# Patient Record
Sex: FEMALE | Race: WHITE | NOT HISPANIC OR LATINO | URBAN - METROPOLITAN AREA
[De-identification: names, ages, dates, MRNs, and addresses within clinical notes are randomized per-mention and may not be internally consistent; named-entity substitution may affect disease eponyms.]

---

## 2020-07-25 ENCOUNTER — TELEPHONE ENCOUNTER (OUTPATIENT)
Dept: URBAN - METROPOLITAN AREA CLINIC 13 | Facility: CLINIC | Age: 45
End: 2020-07-25

## 2020-07-26 ENCOUNTER — TELEPHONE ENCOUNTER (OUTPATIENT)
Dept: URBAN - METROPOLITAN AREA CLINIC 13 | Facility: CLINIC | Age: 45
End: 2020-07-26

## 2020-07-26 RX ORDER — NORETHINDRONE ACETATE AND ETHINYL ESTRADIOL, ETHINYL ESTRADIOL AND FERROUS FUMARATE 1MG-10(24)
TAKE 1 TABLET DAILY KIT ORAL
Refills: 0 | Status: ACTIVE | COMMUNITY

## 2021-09-08 ENCOUNTER — WEB ENCOUNTER (OUTPATIENT)
Dept: URBAN - METROPOLITAN AREA CLINIC 113 | Facility: CLINIC | Age: 46
End: 2021-09-08

## 2021-09-08 ENCOUNTER — LAB OUTSIDE AN ENCOUNTER (OUTPATIENT)
Dept: URBAN - METROPOLITAN AREA CLINIC 113 | Facility: CLINIC | Age: 46
End: 2021-09-08

## 2021-09-08 ENCOUNTER — OFFICE VISIT (OUTPATIENT)
Dept: URBAN - METROPOLITAN AREA CLINIC 113 | Facility: CLINIC | Age: 46
End: 2021-09-08
Payer: COMMERCIAL

## 2021-09-08 VITALS
HEART RATE: 71 BPM | DIASTOLIC BLOOD PRESSURE: 66 MMHG | HEIGHT: 64 IN | SYSTOLIC BLOOD PRESSURE: 109 MMHG | BODY MASS INDEX: 20.14 KG/M2 | TEMPERATURE: 98.4 F | WEIGHT: 118 LBS

## 2021-09-08 DIAGNOSIS — K59.1 FUNCTIONAL DIARRHEA: ICD-10-CM

## 2021-09-08 DIAGNOSIS — R10.32 LEFT LOWER QUADRANT PAIN: ICD-10-CM

## 2021-09-08 DIAGNOSIS — R10.31 RIGHT LOWER QUADRANT PAIN: ICD-10-CM

## 2021-09-08 PROCEDURE — 99204 OFFICE O/P NEW MOD 45 MIN: CPT | Performed by: INTERNAL MEDICINE

## 2021-09-08 RX ORDER — ASCORBIC ACID/ASCORBATE SODIUM 500 MG
1 TABLET TABLET,CHEWABLE ORAL ONCE A DAY
Status: ACTIVE | COMMUNITY

## 2021-09-08 RX ORDER — SPIRONOLACTONE 100 MG/1
1 TABLET TABLET, FILM COATED ORAL ONCE A DAY
Status: ACTIVE | COMMUNITY

## 2021-09-08 RX ORDER — PROGESTERONE 200 MG/1
1 CAPSULE ONE TIME  DAILY CAPSULE ORAL
Status: ACTIVE | COMMUNITY

## 2021-09-08 RX ORDER — B-COMPLEX WITH VITAMIN C
1 TABLET TABLET ORAL ONCE A DAY
Status: ACTIVE | COMMUNITY

## 2021-09-08 RX ORDER — TESTOSTERONE 100 MG
AS DIRECTED PELLET (EA) IMPLANTATION
Status: ACTIVE | COMMUNITY

## 2021-09-08 RX ORDER — NORETHINDRONE ACETATE AND ETHINYL ESTRADIOL, ETHINYL ESTRADIOL AND FERROUS FUMARATE 1MG-10(24)
TAKE 1 TABLET DAILY KIT ORAL
Refills: 0 | Status: ON HOLD | COMMUNITY

## 2021-09-08 RX ORDER — BIMATOPROST 0.1 MG/ML
1 DROP INTO AFFECTED EYE IN THE EVENING SOLUTION/ DROPS OPHTHALMIC ONCE A DAY
Status: ACTIVE | COMMUNITY

## 2021-09-08 NOTE — HPI-TODAY'S VISIT:
45 yo female with a history of diarrhea and blood per rectum, likely due to infectious colitis with post infectious IBS in 2016, presenting for evaluation of abdominal pain.  She has had a several month history of diarrhea occurring intermittently, lately increasing in frequency. This is reported as similar to her symptoms in 2016. No blood per rectum. She has occasional abdominal cramping and bloating, which improves with bowel movements. No fever or chills. She has woken up at night for a bowel movement. At most, 2 or 3 times per night, and as frequently as once per week. Her weight is stable. Appetite is fair. There have been no sick contacts. She denies NSAIDs use. No changes to her medications or new supplements over the last few months. She has not tried Imodium or daily fiber supplementation.  No family history of liver disease, pancreas disease, stomach cancer. Her paternal grandfather had colon cancer. No family history of IBD. Her mom has IBS.

## 2021-09-09 ENCOUNTER — TELEPHONE ENCOUNTER (OUTPATIENT)
Dept: URBAN - METROPOLITAN AREA CLINIC 113 | Facility: CLINIC | Age: 46
End: 2021-09-09

## 2021-09-09 RX ORDER — POLYETHYLENE GLYCOL 3350, SODIUM CHLORIDE, SODIUM BICARBONATE AND POTASSIUM CHLORIDE 420G
AS DIRECTED KIT ORAL ONCE
Qty: 420 GRAM | Refills: 0 | OUTPATIENT
Start: 2021-09-09 | End: 2021-09-10

## 2021-09-10 LAB
A/G RATIO: 2
ALBUMIN: 4.7
ALKALINE PHOSPHATASE: 66
ALT (SGPT): 22
AST (SGOT): 26
BASO (ABSOLUTE): 0
BASOS: 1
BILIRUBIN, TOTAL: 0.4
BUN/CREATININE RATIO: 17
BUN: 10
CALCIUM: 9.5
CARBON DIOXIDE, TOTAL: 26
CHLORIDE: 102
CREATININE: 0.58
DEAMIDATED GLIADIN ABS, IGA: 4
DEAMIDATED GLIADIN ABS, IGG: 2
EGFR IF AFRICN AM: 130
EGFR IF NONAFRICN AM: 112
ENDOMYSIAL ANTIBODY IGA: NEGATIVE
EOS (ABSOLUTE): 0.2
EOS: 4
GLOBULIN, TOTAL: 2.3
GLUCOSE: 97
HEMATOCRIT: 38
HEMATOLOGY COMMENTS:: (no result)
HEMOGLOBIN: 12.8
IMMATURE CELLS: (no result)
IMMATURE GRANS (ABS): 0
IMMATURE GRANULOCYTES: 0
IMMUNOGLOBULIN A, QN, SERUM: 126
LYMPHS (ABSOLUTE): 1.5
LYMPHS: 26
MCH: 34
MCHC: 33.7
MCV: 101
MONOCYTES(ABSOLUTE): 0.4
MONOCYTES: 7
NEUTROPHILS (ABSOLUTE): 3.6
NEUTROPHILS: 62
NRBC: (no result)
PLATELETS: 310
POTASSIUM: 4.1
PROTEIN, TOTAL: 7
RBC: 3.77
RDW: 12
SODIUM: 142
T-TRANSGLUTAMINASE (TTG) IGA: <2
T-TRANSGLUTAMINASE (TTG) IGG: <2
WBC: 5.8

## 2021-09-15 ENCOUNTER — OFFICE VISIT (OUTPATIENT)
Dept: URBAN - METROPOLITAN AREA SURGERY CENTER 25 | Facility: SURGERY CENTER | Age: 46
End: 2021-09-15
Payer: COMMERCIAL

## 2021-09-15 ENCOUNTER — CLAIMS CREATED FROM THE CLAIM WINDOW (OUTPATIENT)
Dept: URBAN - METROPOLITAN AREA CLINIC 4 | Facility: CLINIC | Age: 46
End: 2021-09-15
Payer: COMMERCIAL

## 2021-09-15 DIAGNOSIS — K52.831 COLLAGENOUS COLITIS: ICD-10-CM

## 2021-09-15 LAB
C DIFFICILE TOXINS A+B, EIA: NEGATIVE
CAMPYLOBACTER CULTURE: (no result)
E COLI SHIGA TOXIN EIA: NEGATIVE
LACTOFERRIN, FECAL, QUANT.: <1
Lab: (no result)
Lab: (no result)
OVA + PARASITE EXAM: (no result)
SALMONELLA/SHIGELLA SCREEN: (no result)

## 2021-09-15 PROCEDURE — G8907 PT DOC NO EVENTS ON DISCHARG: HCPCS | Performed by: INTERNAL MEDICINE

## 2021-09-15 PROCEDURE — 88305 TISSUE EXAM BY PATHOLOGIST: CPT | Performed by: PATHOLOGY

## 2021-09-15 PROCEDURE — 88342 IMHCHEM/IMCYTCHM 1ST ANTB: CPT | Performed by: PATHOLOGY

## 2021-09-15 PROCEDURE — 45380 COLONOSCOPY AND BIOPSY: CPT | Performed by: INTERNAL MEDICINE

## 2021-09-15 PROCEDURE — 88313 SPECIAL STAINS GROUP 2: CPT | Performed by: PATHOLOGY

## 2021-09-15 RX ORDER — NORETHINDRONE ACETATE AND ETHINYL ESTRADIOL, ETHINYL ESTRADIOL AND FERROUS FUMARATE 1MG-10(24)
TAKE 1 TABLET DAILY KIT ORAL
Refills: 0 | Status: ON HOLD | COMMUNITY

## 2021-09-15 RX ORDER — PROGESTERONE 200 MG/1
1 CAPSULE ONE TIME  DAILY CAPSULE ORAL
Status: ACTIVE | COMMUNITY

## 2021-09-15 RX ORDER — SPIRONOLACTONE 100 MG/1
1 TABLET TABLET, FILM COATED ORAL ONCE A DAY
Status: ACTIVE | COMMUNITY

## 2021-09-15 RX ORDER — TESTOSTERONE 100 MG
AS DIRECTED PELLET (EA) IMPLANTATION
Status: ACTIVE | COMMUNITY

## 2021-09-15 RX ORDER — ASCORBIC ACID/ASCORBATE SODIUM 500 MG
1 TABLET TABLET,CHEWABLE ORAL ONCE A DAY
Status: ACTIVE | COMMUNITY

## 2021-09-15 RX ORDER — B-COMPLEX WITH VITAMIN C
1 TABLET TABLET ORAL ONCE A DAY
Status: ACTIVE | COMMUNITY

## 2021-09-15 RX ORDER — BIMATOPROST 0.1 MG/ML
1 DROP INTO AFFECTED EYE IN THE EVENING SOLUTION/ DROPS OPHTHALMIC ONCE A DAY
Status: ACTIVE | COMMUNITY

## 2021-09-24 ENCOUNTER — TELEPHONE ENCOUNTER (OUTPATIENT)
Dept: URBAN - METROPOLITAN AREA CLINIC 113 | Facility: CLINIC | Age: 46
End: 2021-09-24

## 2021-09-24 RX ORDER — SPIRONOLACTONE 100 MG/1
1 TABLET TABLET, FILM COATED ORAL ONCE A DAY
Status: ACTIVE | COMMUNITY

## 2021-09-24 RX ORDER — ASCORBIC ACID/ASCORBATE SODIUM 500 MG
1 TABLET TABLET,CHEWABLE ORAL ONCE A DAY
Status: ACTIVE | COMMUNITY

## 2021-09-24 RX ORDER — TESTOSTERONE 100 MG
AS DIRECTED PELLET (EA) IMPLANTATION
Status: ACTIVE | COMMUNITY

## 2021-09-24 RX ORDER — BUDESONIDE 3 MG/1
3 CAPSULES CAPSULE, COATED PELLETS ORAL ONCE A DAY
Qty: 90 CAPSULE | Refills: 1 | OUTPATIENT
Start: 2021-09-24

## 2021-09-24 RX ORDER — PROGESTERONE 200 MG/1
1 CAPSULE ONE TIME  DAILY CAPSULE ORAL
Status: ACTIVE | COMMUNITY

## 2021-09-24 RX ORDER — BIMATOPROST 0.1 MG/ML
1 DROP INTO AFFECTED EYE IN THE EVENING SOLUTION/ DROPS OPHTHALMIC ONCE A DAY
Status: ACTIVE | COMMUNITY

## 2021-09-24 RX ORDER — NORETHINDRONE ACETATE AND ETHINYL ESTRADIOL, ETHINYL ESTRADIOL AND FERROUS FUMARATE 1MG-10(24)
TAKE 1 TABLET DAILY KIT ORAL
Refills: 0 | Status: ON HOLD | COMMUNITY

## 2021-09-24 RX ORDER — B-COMPLEX WITH VITAMIN C
1 TABLET TABLET ORAL ONCE A DAY
Status: ACTIVE | COMMUNITY

## 2021-09-30 ENCOUNTER — TELEPHONE ENCOUNTER (OUTPATIENT)
Dept: URBAN - METROPOLITAN AREA CLINIC 40 | Facility: CLINIC | Age: 46
End: 2021-09-30

## 2021-10-14 ENCOUNTER — OFFICE VISIT (OUTPATIENT)
Dept: URBAN - METROPOLITAN AREA CLINIC 113 | Facility: CLINIC | Age: 46
End: 2021-10-14
Payer: COMMERCIAL

## 2021-10-14 VITALS
HEIGHT: 64 IN | BODY MASS INDEX: 20.14 KG/M2 | TEMPERATURE: 98.4 F | WEIGHT: 118 LBS | SYSTOLIC BLOOD PRESSURE: 95 MMHG | RESPIRATION RATE: 18 BRPM | HEART RATE: 52 BPM | DIASTOLIC BLOOD PRESSURE: 67 MMHG

## 2021-10-14 DIAGNOSIS — K52.831 COLLAGENOUS COLITIS: ICD-10-CM

## 2021-10-14 PROCEDURE — 99213 OFFICE O/P EST LOW 20 MIN: CPT | Performed by: NURSE PRACTITIONER

## 2021-10-14 RX ORDER — TESTOSTERONE 100 MG
AS DIRECTED PELLET (EA) IMPLANTATION
Status: ACTIVE | COMMUNITY

## 2021-10-14 RX ORDER — NORETHINDRONE ACETATE AND ETHINYL ESTRADIOL, ETHINYL ESTRADIOL AND FERROUS FUMARATE 1MG-10(24)
TAKE 1 TABLET DAILY KIT ORAL
Refills: 0 | Status: ON HOLD | COMMUNITY

## 2021-10-14 RX ORDER — BUDESONIDE 3 MG/1
3 CAPSULES CAPSULE, COATED PELLETS ORAL ONCE A DAY
OUTPATIENT
Start: 2021-09-24

## 2021-10-14 RX ORDER — BIMATOPROST 0.1 MG/ML
1 DROP INTO AFFECTED EYE IN THE EVENING SOLUTION/ DROPS OPHTHALMIC ONCE A DAY
Status: ACTIVE | COMMUNITY

## 2021-10-14 RX ORDER — B-COMPLEX WITH VITAMIN C
1 TABLET TABLET ORAL ONCE A DAY
Status: ACTIVE | COMMUNITY

## 2021-10-14 RX ORDER — PROGESTERONE 200 MG/1
1 CAPSULE ONE TIME  DAILY CAPSULE ORAL
Status: ACTIVE | COMMUNITY

## 2021-10-14 RX ORDER — BUDESONIDE 3 MG/1
3 CAPSULES CAPSULE, COATED PELLETS ORAL ONCE A DAY
Qty: 90 CAPSULE | Refills: 1 | Status: ACTIVE | COMMUNITY
Start: 2021-09-24

## 2021-10-14 RX ORDER — ASCORBIC ACID/ASCORBATE SODIUM 500 MG
1 TABLET TABLET,CHEWABLE ORAL ONCE A DAY
Status: ACTIVE | COMMUNITY

## 2021-10-14 RX ORDER — SPIRONOLACTONE 100 MG/1
1 TABLET TABLET, FILM COATED ORAL ONCE A DAY
Status: ACTIVE | COMMUNITY

## 2022-01-12 ENCOUNTER — TELEPHONE ENCOUNTER (OUTPATIENT)
Dept: URBAN - METROPOLITAN AREA CLINIC 113 | Facility: CLINIC | Age: 47
End: 2022-01-12

## 2022-01-12 RX ORDER — BUDESONIDE 3 MG/1
3 CAPSULES DAILY FOR 8 WEEKS, THEN 2 CAPSULES DAILY FOR 2 WEEKS, THEN 1 CAPSULE DAILY FOR 2 WEEKS, THEN STOP CAPSULE, COATED PELLETS ORAL ONCE A DAY
Qty: 210 | Refills: 0
Start: 2021-09-24

## 2022-02-10 ENCOUNTER — OFFICE VISIT (OUTPATIENT)
Dept: URBAN - METROPOLITAN AREA CLINIC 113 | Facility: CLINIC | Age: 47
End: 2022-02-10
Payer: COMMERCIAL

## 2022-02-10 VITALS
SYSTOLIC BLOOD PRESSURE: 92 MMHG | WEIGHT: 117 LBS | DIASTOLIC BLOOD PRESSURE: 60 MMHG | HEIGHT: 64 IN | TEMPERATURE: 98 F | HEART RATE: 52 BPM | BODY MASS INDEX: 19.97 KG/M2

## 2022-02-10 DIAGNOSIS — K52.831 COLLAGENOUS COLITIS: ICD-10-CM

## 2022-02-10 PROCEDURE — 99213 OFFICE O/P EST LOW 20 MIN: CPT | Performed by: INTERNAL MEDICINE

## 2022-02-10 RX ORDER — TESTOSTERONE 100 MG
AS DIRECTED PELLET (EA) IMPLANTATION
Status: ACTIVE | COMMUNITY

## 2022-02-10 RX ORDER — SPIRONOLACTONE 100 MG/1
1 TABLET TABLET, FILM COATED ORAL ONCE A DAY
Status: ACTIVE | COMMUNITY

## 2022-02-10 RX ORDER — ASCORBIC ACID/ASCORBATE SODIUM 500 MG
1 TABLET TABLET,CHEWABLE ORAL ONCE A DAY
Status: ACTIVE | COMMUNITY

## 2022-02-10 RX ORDER — BUDESONIDE 3 MG/1
3 CAPSULES DAILY FOR 8 WEEKS, THEN 2 CAPSULES DAILY FOR 2 WEEKS, THEN 1 CAPSULE DAILY FOR 2 WEEKS, THEN STOP CAPSULE, COATED PELLETS ORAL ONCE A DAY
Qty: 210 | Refills: 0 | Status: ACTIVE | COMMUNITY
Start: 2021-09-24

## 2022-02-10 RX ORDER — PROGESTERONE 200 MG/1
1 CAPSULE ONE TIME  DAILY CAPSULE ORAL
Status: ACTIVE | COMMUNITY

## 2022-02-10 RX ORDER — NORETHINDRONE ACETATE AND ETHINYL ESTRADIOL, ETHINYL ESTRADIOL AND FERROUS FUMARATE 1MG-10(24)
TAKE 1 TABLET DAILY KIT ORAL
Refills: 0 | Status: ON HOLD | COMMUNITY

## 2022-02-10 RX ORDER — B-COMPLEX WITH VITAMIN C
1 TABLET TABLET ORAL ONCE A DAY
Status: ACTIVE | COMMUNITY

## 2022-02-10 RX ORDER — BIMATOPROST 0.1 MG/ML
1 DROP INTO AFFECTED EYE IN THE EVENING SOLUTION/ DROPS OPHTHALMIC ONCE A DAY
Status: ACTIVE | COMMUNITY

## 2022-02-10 RX ORDER — BUDESONIDE 3 MG/1
3 CAPSULES CAPSULE, COATED PELLETS ORAL ONCE A DAY
OUTPATIENT

## 2022-02-10 NOTE — HPI-TODAY'S VISIT:
46-year-old woman with a several month history of lower abdominal cramping with diarrhea predominant change in bowel habits, likely on the basis of functional bowel disorder, presenting for follow-up after labs and colonoscopy. She was seen in the office on 9/8/2021.  She complained of a several month history of lower abdominal cramping with diarrhea.  There was no fever, chills, nocturnal stools or blood per rectum.  Stool studies were negative for Salmonella and Campylobacter, ova and parasite, C. difficile.  Lactoferrin was normal.  Celiac panel was negative as well.  CMP was normal as was CBC.  A colonoscopy was performed 9/15/2021.  Good bowel preparation.  Normal ileum.  Normal rectum.  Normal mucosa throughout the entire examined colon.  Random biopsies were obtained and demonstrated collagenous colitis.  She was started on budesonide, 3 tablets daily for 8 weeks. She finished 8 weeks of budesonide treatment and was doing fairly well however it slowly returned.  She is having about 3-4 bowel movements per day with some mild cramping and no blood.  She started back up on the budesonide for the last 3 weeks.  She is now having one soft bowel movement per day without blood or melena or abdominal pain.  This been no fevers or chills.  There is no nausea or vomiting.

## 2022-04-12 ENCOUNTER — TELEPHONE ENCOUNTER (OUTPATIENT)
Dept: URBAN - METROPOLITAN AREA CLINIC 113 | Facility: CLINIC | Age: 47
End: 2022-04-12

## 2022-04-12 RX ORDER — BUDESONIDE 3 MG/1
3 CAPSULES CAPSULE, COATED PELLETS ORAL ONCE A DAY
Qty: 90 CAPSULE | Refills: 0

## 2022-05-06 ENCOUNTER — OFFICE VISIT (OUTPATIENT)
Dept: URBAN - METROPOLITAN AREA CLINIC 113 | Facility: CLINIC | Age: 47
End: 2022-05-06
Payer: COMMERCIAL

## 2022-05-06 VITALS
TEMPERATURE: 98.4 F | SYSTOLIC BLOOD PRESSURE: 96 MMHG | DIASTOLIC BLOOD PRESSURE: 63 MMHG | HEART RATE: 53 BPM | BODY MASS INDEX: 19.63 KG/M2 | HEIGHT: 64 IN | WEIGHT: 115 LBS

## 2022-05-06 DIAGNOSIS — K59.1 FUNCTIONAL DIARRHEA: ICD-10-CM

## 2022-05-06 DIAGNOSIS — R10.32 LEFT LOWER QUADRANT PAIN: ICD-10-CM

## 2022-05-06 DIAGNOSIS — R10.31 RIGHT LOWER QUADRANT PAIN: ICD-10-CM

## 2022-05-06 DIAGNOSIS — K52.831 COLLAGENOUS COLITIS: ICD-10-CM

## 2022-05-06 PROBLEM — 47812002: Status: ACTIVE | Noted: 2021-09-08

## 2022-05-06 PROBLEM — 54586004: Status: ACTIVE | Noted: 2021-09-08

## 2022-05-06 PROBLEM — 301716002: Status: ACTIVE | Noted: 2021-09-08

## 2022-05-06 PROCEDURE — 99214 OFFICE O/P EST MOD 30 MIN: CPT | Performed by: NURSE PRACTITIONER

## 2022-05-06 RX ORDER — SPIRONOLACTONE 100 MG/1
1 TABLET TABLET, FILM COATED ORAL ONCE A DAY
Status: ACTIVE | COMMUNITY

## 2022-05-06 RX ORDER — BUDESONIDE 3 MG/1
3 CAPSULES CAPSULE, COATED PELLETS ORAL ONCE A DAY
Qty: 90 CAPSULE | Refills: 0 | Status: ACTIVE | COMMUNITY

## 2022-05-06 RX ORDER — PROGESTERONE 200 MG/1
1 CAPSULE ONE TIME  DAILY CAPSULE ORAL
Status: ACTIVE | COMMUNITY

## 2022-05-06 RX ORDER — NORETHINDRONE ACETATE AND ETHINYL ESTRADIOL, ETHINYL ESTRADIOL AND FERROUS FUMARATE 1MG-10(24)
TAKE 1 TABLET DAILY KIT ORAL
Refills: 0 | Status: ON HOLD | COMMUNITY

## 2022-05-06 RX ORDER — ASCORBIC ACID/ASCORBATE SODIUM 500 MG
1 TABLET TABLET,CHEWABLE ORAL ONCE A DAY
Status: ACTIVE | COMMUNITY

## 2022-05-06 RX ORDER — BIMATOPROST 0.1 MG/ML
1 DROP INTO AFFECTED EYE IN THE EVENING SOLUTION/ DROPS OPHTHALMIC ONCE A DAY
Status: ACTIVE | COMMUNITY

## 2022-05-06 RX ORDER — TESTOSTERONE 100 MG
AS DIRECTED PELLET (EA) IMPLANTATION
Status: ACTIVE | COMMUNITY

## 2022-05-06 RX ORDER — COLESEVELAM HYDROCHLORIDE 625 MG/1
1 TABLET TABLET, COATED ORAL ONCE DAILY
Qty: 30 | Refills: 3 | OUTPATIENT
Start: 2022-05-06

## 2022-05-06 RX ORDER — BRIMONIDINE TARTRATE 1 MG/ML
1 DROP INTO AFFECTED EYE SOLUTION/ DROPS OPHTHALMIC
Status: ACTIVE | COMMUNITY

## 2022-05-06 RX ORDER — B-COMPLEX WITH VITAMIN C
1 TABLET TABLET ORAL ONCE A DAY
Status: ACTIVE | COMMUNITY

## 2022-05-06 RX ORDER — BUDESONIDE 3 MG/1
3 CAPSULES CAPSULE, COATED PELLETS ORAL ONCE A DAY
OUTPATIENT

## 2022-05-06 RX ORDER — HYOSCYAMINE SULFATE 0.12 MG/1
1 TABLET UNDER THE TONGUE AND ALLOW TO DISSOLVE  AS NEEDED TABLET SUBLINGUAL
Qty: 90 | Refills: 3 | OUTPATIENT
Start: 2022-05-06 | End: 2022-09-03

## 2022-05-06 NOTE — HPI-TODAY'S VISIT:
48 yo woman with a several month history of lower abdominal cramping with diarrhea, with random colon biopsies showing collagenous colitis, presenting for follow up.  She was seen in the office on 2/10/22 in follow up for collagenous colitis, having completed budesonide 9 mg daily for 8 weeks. Following stopping budesonide, she experienced recurrent diarrhea, prompting resumption of budesonide 9 mg daily three weeks prior to the visit. She was to continue budesonide 9 mg daily for one week, then decrease to 6 mg daily for a couple of weeks, then 3 mg daily for a couple of weeks then stop. She contacted the office on 4/12/22 requesting a refill of budesonide. She reported continued diarrhea on 3 mg daily, prompting she resume 6 mg to 9 mg per day.  She is taking 6 mg budesonide daily, with 2 to 3 semiformed stools daily in the morning. She has lower abdominal cramping associated with morning bowel movements. No blood per rectum, nocturnal stools, fever or chills. No nausea or vomiting. She tells me that she when she decreased to 3 mg daily, she had increased stool frequency and increase in watery stools.

## 2022-05-10 LAB
ENDOMYSIAL ANTIBODY IGA: NEGATIVE
IMMUNOGLOBULIN A, QN, SERUM: 118
T-TRANSGLUTAMINASE (TTG) IGA: <2

## 2022-05-16 ENCOUNTER — TELEPHONE ENCOUNTER (OUTPATIENT)
Dept: URBAN - METROPOLITAN AREA CLINIC 113 | Facility: CLINIC | Age: 47
End: 2022-05-16

## 2022-05-26 LAB
C DIFFICILE TOXINS A+B, EIA: NEGATIVE
CAMPYLOBACTER CULTURE: (no result)
E COLI SHIGA TOXIN EIA: NEGATIVE
GIARDIA LAMBLIA AG, EIA: NEGATIVE
Lab: (no result)
OVA + PARASITE EXAM: (no result)
SALMONELLA/SHIGELLA SCREEN: (no result)

## 2022-06-17 ENCOUNTER — OFFICE VISIT (OUTPATIENT)
Dept: URBAN - METROPOLITAN AREA CLINIC 113 | Facility: CLINIC | Age: 47
End: 2022-06-17

## 2022-06-17 RX ORDER — PROGESTERONE 200 MG/1
1 CAPSULE ONE TIME  DAILY CAPSULE ORAL
Status: ACTIVE | COMMUNITY

## 2022-06-17 RX ORDER — SPIRONOLACTONE 100 MG/1
1 TABLET TABLET, FILM COATED ORAL ONCE A DAY
Status: ACTIVE | COMMUNITY

## 2022-06-17 RX ORDER — HYOSCYAMINE SULFATE 0.12 MG/1
1 TABLET UNDER THE TONGUE AND ALLOW TO DISSOLVE  AS NEEDED TABLET SUBLINGUAL
Qty: 90 | Refills: 3 | Status: ACTIVE | COMMUNITY
Start: 2022-05-06 | End: 2022-09-03

## 2022-06-17 RX ORDER — ASCORBIC ACID/ASCORBATE SODIUM 500 MG
1 TABLET TABLET,CHEWABLE ORAL ONCE A DAY
Status: ACTIVE | COMMUNITY

## 2022-06-17 RX ORDER — COLESEVELAM HYDROCHLORIDE 625 MG/1
1 TABLET TABLET, COATED ORAL ONCE DAILY
Qty: 30 | Refills: 3 | Status: ACTIVE | COMMUNITY
Start: 2022-05-06

## 2022-06-17 RX ORDER — BRIMONIDINE TARTRATE 1 MG/ML
1 DROP INTO AFFECTED EYE SOLUTION/ DROPS OPHTHALMIC
Status: ACTIVE | COMMUNITY

## 2022-06-17 RX ORDER — BIMATOPROST 0.1 MG/ML
1 DROP INTO AFFECTED EYE IN THE EVENING SOLUTION/ DROPS OPHTHALMIC ONCE A DAY
Status: ACTIVE | COMMUNITY

## 2022-06-17 RX ORDER — BUDESONIDE 3 MG/1
3 CAPSULES CAPSULE, COATED PELLETS ORAL ONCE A DAY
Status: ACTIVE | COMMUNITY

## 2022-06-17 RX ORDER — NORETHINDRONE ACETATE AND ETHINYL ESTRADIOL, ETHINYL ESTRADIOL AND FERROUS FUMARATE 1MG-10(24)
TAKE 1 TABLET DAILY KIT ORAL
Refills: 0 | Status: ON HOLD | COMMUNITY

## 2022-06-17 RX ORDER — TESTOSTERONE 100 MG
AS DIRECTED PELLET (EA) IMPLANTATION
Status: ACTIVE | COMMUNITY

## 2022-06-17 RX ORDER — B-COMPLEX WITH VITAMIN C
1 TABLET TABLET ORAL ONCE A DAY
Status: ACTIVE | COMMUNITY

## 2022-06-17 NOTE — HPI-TODAY'S VISIT:
48 yo woman with a several month history of lower abdominal cramping with diarrhea, with random colon biopsies showing collagenous colitis, presenting for follow up.  She was seen in the office on 2/10/22 in follow up for collagenous colitis, having completed budesonide 9 mg daily for 8 weeks. Following stopping budesonide, she experienced recurrent diarrhea, prompting resumption of budesonide 9 mg daily three weeks prior to the visit. She was to continue budesonide 9 mg daily for one week, then decrease to 6 mg daily for a couple of weeks, then 3 mg daily for a couple of weeks then stop. She contacted the office on 4/12/22 requesting a refill of budesonide. She reported continued diarrhea on 3 mg daily, prompting she resume 6 mg to 9 mg per day.  She is taking 6 mg budesonide daily, with 2 to 3 semiformed stools daily in the morning. She has lower abdominal cramping associated with morning bowel movements. No blood per rectum, nocturnal stools, fever or chills. No nausea or vomiting. She tells me that she when she decreased to 3 mg daily, she had increased stool frequency and increase in watery stools.   Stool studies 5/20/22: negative for culture, C. difficile and Giardia/ova and parasite. Celiac disease panel is also negative.

## 2022-07-20 ENCOUNTER — TELEPHONE ENCOUNTER (OUTPATIENT)
Dept: URBAN - METROPOLITAN AREA CLINIC 113 | Facility: CLINIC | Age: 47
End: 2022-07-20

## 2022-07-20 RX ORDER — BUDESONIDE 3 MG/1
3 CAPSULES CAPSULE, COATED PELLETS ORAL ONCE A DAY
Qty: 90 | Refills: 3

## 2022-10-27 ENCOUNTER — OFFICE VISIT (OUTPATIENT)
Dept: URBAN - METROPOLITAN AREA CLINIC 113 | Facility: CLINIC | Age: 47
End: 2022-10-27
Payer: COMMERCIAL

## 2022-10-27 VITALS
HEART RATE: 51 BPM | DIASTOLIC BLOOD PRESSURE: 66 MMHG | HEIGHT: 64 IN | BODY MASS INDEX: 20.66 KG/M2 | SYSTOLIC BLOOD PRESSURE: 93 MMHG | TEMPERATURE: 97.5 F | WEIGHT: 121 LBS | RESPIRATION RATE: 18 BRPM

## 2022-10-27 DIAGNOSIS — K52.831 COLLAGENOUS COLITIS: ICD-10-CM

## 2022-10-27 PROBLEM — 19311003: Status: ACTIVE | Noted: 2021-09-24

## 2022-10-27 PROCEDURE — 99213 OFFICE O/P EST LOW 20 MIN: CPT | Performed by: INTERNAL MEDICINE

## 2022-10-27 RX ORDER — COLESEVELAM HYDROCHLORIDE 625 MG/1
1 TABLET TABLET, COATED ORAL ONCE DAILY
Qty: 30 | Refills: 3 | Status: ACTIVE | COMMUNITY
Start: 2022-05-06

## 2022-10-27 RX ORDER — B-COMPLEX WITH VITAMIN C
1 TABLET TABLET ORAL ONCE A DAY
Status: ACTIVE | COMMUNITY

## 2022-10-27 RX ORDER — BRIMONIDINE TARTRATE 1 MG/ML
1 DROP INTO AFFECTED EYE SOLUTION/ DROPS OPHTHALMIC
Status: ACTIVE | COMMUNITY

## 2022-10-27 RX ORDER — BUDESONIDE 3 MG/1
3 CAPSULES CAPSULE, COATED PELLETS ORAL ONCE A DAY
Qty: 90 | Refills: 3

## 2022-10-27 RX ORDER — BUDESONIDE 3 MG/1
3 CAPSULES CAPSULE, COATED PELLETS ORAL ONCE A DAY
Qty: 90 | Refills: 3 | Status: ACTIVE | COMMUNITY

## 2022-10-27 RX ORDER — PROGESTERONE 200 MG/1
1 CAPSULE ONE TIME  DAILY CAPSULE ORAL
Status: ACTIVE | COMMUNITY

## 2022-10-27 RX ORDER — BIMATOPROST 0.1 MG/ML
1 DROP INTO AFFECTED EYE IN THE EVENING SOLUTION/ DROPS OPHTHALMIC ONCE A DAY
Status: ACTIVE | COMMUNITY

## 2022-10-27 RX ORDER — SPIRONOLACTONE 100 MG/1
1 TABLET TABLET, FILM COATED ORAL ONCE A DAY
Status: ACTIVE | COMMUNITY

## 2022-10-27 RX ORDER — ASCORBIC ACID/ASCORBATE SODIUM 500 MG
1 TABLET TABLET,CHEWABLE ORAL ONCE A DAY
Status: ACTIVE | COMMUNITY

## 2022-10-27 RX ORDER — TESTOSTERONE 100 MG
AS DIRECTED PELLET (EA) IMPLANTATION
Status: ACTIVE | COMMUNITY

## 2022-10-27 RX ORDER — NORETHINDRONE ACETATE AND ETHINYL ESTRADIOL, ETHINYL ESTRADIOL AND FERROUS FUMARATE 1MG-10(24)
TAKE 1 TABLET DAILY KIT ORAL
Refills: 0 | Status: ON HOLD | COMMUNITY

## 2022-10-27 NOTE — HPI-TODAY'S VISIT:
48 yo woman with a several month history of lower abdominal cramping with diarrhea, with random colon biopsies showing collagenous colitis, presenting for follow up.  She was seen in the office on 2/10/22 in follow up for collagenous colitis, having completed budesonide 9 mg daily for 8 weeks. Following stopping budesonide, she experienced recurrent diarrhea, prompting resumption of budesonide 9 mg daily three weeks prior to the visit. She was to continue budesonide 9 mg daily for one week, then decrease to 6 mg daily for a couple of weeks, then 3 mg daily for a couple of weeks then stop. She contacted the office on 4/12/22 requesting a refill of budesonide. She reported continued diarrhea on 3 mg daily, prompting she resume 6 mg to 9 mg per day.  She has tried to wean off the budesonide but every time she decreases to 3 mg a day her diarrheal symptoms increase.  She tried the bile Binder tablet but took it a few times a week only but it did not make a difference.  She is back on 9 mg a day of budesonide and is having 2-3 soft bowel movements per day there's been no blood or melena nausea or vomiting, fevers or chills.  There is no abdominal pain heartburn or dysphagia.  There's been no weight loss.  She denies any NSAID use.   Stool studies 5/20/22: negative for culture, C. difficile and Giardia/ova and parasite. Celiac disease panel is also negative.

## 2022-10-27 NOTE — HPI-OTHER HISTORIES
Colonoscopy on 9/15/2021 showed a normal terminal ileum, normal colonic mucosa, there is no evidence for colitis or polyps.  Random colon biopsies revealed collagenous colitis.

## 2022-11-17 ENCOUNTER — TELEPHONE ENCOUNTER (OUTPATIENT)
Dept: URBAN - METROPOLITAN AREA CLINIC 113 | Facility: CLINIC | Age: 47
End: 2022-11-17

## 2022-11-17 RX ORDER — BUDESONIDE 3 MG/1
3 CAPSULES CAPSULE, COATED PELLETS ORAL ONCE A DAY
Qty: 90 | Refills: 3

## 2023-05-16 ENCOUNTER — LAB OUTSIDE AN ENCOUNTER (OUTPATIENT)
Dept: URBAN - METROPOLITAN AREA CLINIC 113 | Facility: CLINIC | Age: 48
End: 2023-05-16

## 2023-05-16 ENCOUNTER — OFFICE VISIT (OUTPATIENT)
Dept: URBAN - METROPOLITAN AREA CLINIC 113 | Facility: CLINIC | Age: 48
End: 2023-05-16
Payer: COMMERCIAL

## 2023-05-16 VITALS
WEIGHT: 117 LBS | BODY MASS INDEX: 19.97 KG/M2 | RESPIRATION RATE: 14 BRPM | DIASTOLIC BLOOD PRESSURE: 56 MMHG | TEMPERATURE: 97.7 F | HEART RATE: 55 BPM | SYSTOLIC BLOOD PRESSURE: 104 MMHG | HEIGHT: 64 IN

## 2023-05-16 DIAGNOSIS — K52.831 COLLAGENOUS COLITIS: ICD-10-CM

## 2023-05-16 PROCEDURE — 99213 OFFICE O/P EST LOW 20 MIN: CPT | Performed by: INTERNAL MEDICINE

## 2023-05-16 RX ORDER — BUDESONIDE 3 MG/1
3 CAPSULES CAPSULE, COATED PELLETS ORAL ONCE A DAY
Qty: 90 | Refills: 3

## 2023-05-16 RX ORDER — SPIRONOLACTONE 100 MG/1
1 TABLET TABLET, FILM COATED ORAL ONCE A DAY
Status: ACTIVE | COMMUNITY

## 2023-05-16 RX ORDER — BRIMONIDINE TARTRATE 1 MG/ML
1 DROP INTO AFFECTED EYE SOLUTION/ DROPS OPHTHALMIC
Status: ACTIVE | COMMUNITY

## 2023-05-16 RX ORDER — B-COMPLEX WITH VITAMIN C
1 TABLET TABLET ORAL ONCE A DAY
Status: ACTIVE | COMMUNITY

## 2023-05-16 RX ORDER — PROGESTERONE 200 MG/1
1 CAPSULE ONE TIME  DAILY CAPSULE ORAL
Status: ACTIVE | COMMUNITY

## 2023-05-16 RX ORDER — ASCORBIC ACID/ASCORBATE SODIUM 500 MG
1 TABLET TABLET,CHEWABLE ORAL ONCE A DAY
Status: ACTIVE | COMMUNITY

## 2023-05-16 RX ORDER — BUDESONIDE 3 MG/1
3 CAPSULES CAPSULE, COATED PELLETS ORAL ONCE A DAY
Qty: 90 | Refills: 3 | Status: ACTIVE | COMMUNITY

## 2023-05-16 RX ORDER — NORETHINDRONE ACETATE AND ETHINYL ESTRADIOL, ETHINYL ESTRADIOL AND FERROUS FUMARATE 1MG-10(24)
TAKE 1 TABLET DAILY KIT ORAL
Refills: 0 | Status: ON HOLD | COMMUNITY

## 2023-05-16 RX ORDER — COLESEVELAM HYDROCHLORIDE 625 MG/1
1 TABLET TABLET, COATED ORAL ONCE DAILY
Qty: 30 | Refills: 3 | Status: ON HOLD | COMMUNITY
Start: 2022-05-06

## 2023-05-16 RX ORDER — TESTOSTERONE 100 MG
AS DIRECTED PELLET (EA) IMPLANTATION
Status: ACTIVE | COMMUNITY

## 2023-05-16 RX ORDER — BIMATOPROST 0.1 MG/ML
1 DROP INTO AFFECTED EYE IN THE EVENING SOLUTION/ DROPS OPHTHALMIC ONCE A DAY
Status: ACTIVE | COMMUNITY

## 2023-05-16 NOTE — HPI-TODAY'S VISIT:
48-yo woman with a several month history of lower abdominal cramping with diarrhea, with random colon biopsies showing collagenous colitis, presenting for follow up.  She was seen in the office on 2/10/22 in follow up for collagenous colitis, having completed budesonide 9 mg daily for 8 weeks. Following stopping budesonide, she experienced recurrent diarrhea, prompting resumption of budesonide 9 mg daily three weeks prior to the visit. She was to continue budesonide 9 mg daily for one week, then decrease to 6 mg daily for a couple of weeks, then 3 mg daily for a couple of weeks then stop. She contacted the office on 4/12/22 requesting a refill of budesonide. She reported continued diarrhea on 3 mg daily, prompting she resume 6 mg to 9 mg per day.  She has tried to wean off the budesonide but every time she decreases to 3 mg a day her diarrheal symptoms increase.  She tried the bile Binder tablet but took it a few times a week only but it did not make a difference.    Is still having difficulty with diarrhea.  When she has a flare as she'll have 4-8 stools per day and sometimes even nocturnal stools.  These are usually loose and watery.  There is no blood and no fever.  She does occasionally get some abdominal pain with it.  She gets some nausea with it but no vomiting.  When she is not on a flare she'll have one bowel movement today that usually is formed and soft.  She is using budesonide and using 2 tablets a day really does not help.  When she takes 3 times a day that does improve her symptoms.  She tried a bile binders before and that did not help.  She also try the Pepto-Bismol tablets 3 times a day for 14 days but that did not make any difference.  She does get heartburn a few times per week but does not use anything for it.  There's been no dysphagia.  There is no bright red blood per rectum or any melena.   Stool studies 5/20/22: negative for culture, C. difficile and Giardia/ova and parasite. Celiac disease panel is also negative.

## 2023-06-08 ENCOUNTER — WEB ENCOUNTER (OUTPATIENT)
Dept: URBAN - METROPOLITAN AREA SURGERY CENTER 25 | Facility: SURGERY CENTER | Age: 48
End: 2023-06-08

## 2023-06-13 ENCOUNTER — OUT OF OFFICE VISIT (OUTPATIENT)
Dept: URBAN - METROPOLITAN AREA SURGERY CENTER 25 | Facility: SURGERY CENTER | Age: 48
End: 2023-06-13

## 2023-06-13 ENCOUNTER — CLAIMS CREATED FROM THE CLAIM WINDOW (OUTPATIENT)
Dept: URBAN - METROPOLITAN AREA SURGERY CENTER 25 | Facility: SURGERY CENTER | Age: 48
End: 2023-06-13
Payer: COMMERCIAL

## 2023-06-13 ENCOUNTER — CLAIMS CREATED FROM THE CLAIM WINDOW (OUTPATIENT)
Dept: URBAN - METROPOLITAN AREA CLINIC 4 | Facility: CLINIC | Age: 48
End: 2023-06-13
Payer: COMMERCIAL

## 2023-06-13 DIAGNOSIS — D12.2 ADENOMA OF ASCENDING COLON: ICD-10-CM

## 2023-06-13 DIAGNOSIS — D12.2 ADENOMATOUS POLYP OF ASCENDING COLON: ICD-10-CM

## 2023-06-13 DIAGNOSIS — D12.2 BENIGN NEOPLASM OF ASCENDING COLON: ICD-10-CM

## 2023-06-13 DIAGNOSIS — R19.7 CHRONIC DIARRHEA: ICD-10-CM

## 2023-06-13 PROCEDURE — 45380 COLONOSCOPY AND BIOPSY: CPT | Performed by: INTERNAL MEDICINE

## 2023-06-13 PROCEDURE — 00811 ANES LWR INTST NDSC NOS: CPT | Performed by: ANESTHESIOLOGY

## 2023-06-13 PROCEDURE — 45385 COLONOSCOPY W/LESION REMOVAL: CPT | Performed by: INTERNAL MEDICINE

## 2023-06-13 PROCEDURE — 88305 TISSUE EXAM BY PATHOLOGIST: CPT | Performed by: PATHOLOGY

## 2023-06-13 PROCEDURE — G8907 PT DOC NO EVENTS ON DISCHARG: HCPCS | Performed by: INTERNAL MEDICINE

## 2023-06-13 PROCEDURE — 00811 ANES LWR INTST NDSC NOS: CPT | Performed by: ANESTHESIOLOGIST ASSISTANT

## 2023-06-13 RX ORDER — PROGESTERONE 200 MG/1
1 CAPSULE ONE TIME  DAILY CAPSULE ORAL
Status: ACTIVE | COMMUNITY

## 2023-06-13 RX ORDER — BRIMONIDINE TARTRATE 1 MG/ML
1 DROP INTO AFFECTED EYE SOLUTION/ DROPS OPHTHALMIC
Status: ACTIVE | COMMUNITY

## 2023-06-13 RX ORDER — TESTOSTERONE 100 MG
AS DIRECTED PELLET (EA) IMPLANTATION
Status: ACTIVE | COMMUNITY

## 2023-06-13 RX ORDER — BIMATOPROST 0.1 MG/ML
1 DROP INTO AFFECTED EYE IN THE EVENING SOLUTION/ DROPS OPHTHALMIC ONCE A DAY
Status: ACTIVE | COMMUNITY

## 2023-06-13 RX ORDER — B-COMPLEX WITH VITAMIN C
1 TABLET TABLET ORAL ONCE A DAY
Status: ACTIVE | COMMUNITY

## 2023-06-13 RX ORDER — BUDESONIDE 3 MG/1
3 CAPSULES CAPSULE, COATED PELLETS ORAL ONCE A DAY
Qty: 90 | Refills: 3 | Status: ACTIVE | COMMUNITY

## 2023-06-13 RX ORDER — COLESEVELAM HYDROCHLORIDE 625 MG/1
1 TABLET TABLET, COATED ORAL ONCE DAILY
Qty: 30 | Refills: 3 | Status: ON HOLD | COMMUNITY
Start: 2022-05-06

## 2023-06-13 RX ORDER — SPIRONOLACTONE 100 MG/1
1 TABLET TABLET, FILM COATED ORAL ONCE A DAY
Status: ACTIVE | COMMUNITY

## 2023-06-13 RX ORDER — NORETHINDRONE ACETATE AND ETHINYL ESTRADIOL, ETHINYL ESTRADIOL AND FERROUS FUMARATE 1MG-10(24)
TAKE 1 TABLET DAILY KIT ORAL
Refills: 0 | Status: ON HOLD | COMMUNITY

## 2023-06-13 RX ORDER — ASCORBIC ACID/ASCORBATE SODIUM 500 MG
1 TABLET TABLET,CHEWABLE ORAL ONCE A DAY
Status: ACTIVE | COMMUNITY

## 2023-06-20 ENCOUNTER — WEB ENCOUNTER (OUTPATIENT)
Dept: URBAN - METROPOLITAN AREA CLINIC 113 | Facility: CLINIC | Age: 48
End: 2023-06-20

## 2023-06-20 RX ORDER — COLESEVELAM HYDROCHLORIDE 625 MG/1
1 TO 3 TABLETS WITH MEALS TABLET, COATED ORAL TWICE A DAY
Qty: 180 | Refills: 3 | OUTPATIENT
Start: 2023-06-21

## 2023-06-29 ENCOUNTER — OFFICE VISIT (OUTPATIENT)
Dept: URBAN - METROPOLITAN AREA CLINIC 113 | Facility: CLINIC | Age: 48
End: 2023-06-29

## 2023-08-08 ENCOUNTER — DASHBOARD ENCOUNTERS (OUTPATIENT)
Age: 48
End: 2023-08-08

## 2023-08-08 ENCOUNTER — OFFICE VISIT (OUTPATIENT)
Dept: URBAN - METROPOLITAN AREA CLINIC 113 | Facility: CLINIC | Age: 48
End: 2023-08-08
Payer: COMMERCIAL

## 2023-08-08 VITALS
RESPIRATION RATE: 16 BRPM | WEIGHT: 114 LBS | BODY MASS INDEX: 19.46 KG/M2 | HEART RATE: 57 BPM | DIASTOLIC BLOOD PRESSURE: 58 MMHG | SYSTOLIC BLOOD PRESSURE: 102 MMHG | TEMPERATURE: 97.8 F | HEIGHT: 64 IN

## 2023-08-08 DIAGNOSIS — K52.831 COLLAGENOUS COLITIS: ICD-10-CM

## 2023-08-08 PROCEDURE — 99213 OFFICE O/P EST LOW 20 MIN: CPT | Performed by: NURSE PRACTITIONER

## 2023-08-08 RX ORDER — TESTOSTERONE 100 MG
AS DIRECTED PELLET (EA) IMPLANTATION
Status: ACTIVE | COMMUNITY

## 2023-08-08 RX ORDER — SPIRONOLACTONE 100 MG/1
1 TABLET TABLET, FILM COATED ORAL ONCE A DAY
Status: ACTIVE | COMMUNITY

## 2023-08-08 RX ORDER — COLESEVELAM HYDROCHLORIDE 625 MG/1
1 TABLET TABLET, COATED ORAL ONCE DAILY
Qty: 30 | Refills: 3 | Status: ON HOLD | COMMUNITY
Start: 2022-05-06

## 2023-08-08 RX ORDER — COLESEVELAM HYDROCHLORIDE 625 MG/1
1 TO 3 TABLETS WITH MEALS TABLET, COATED ORAL TWICE A DAY
Qty: 180 | Refills: 3 | Status: ACTIVE | COMMUNITY
Start: 2023-06-21

## 2023-08-08 RX ORDER — PROGESTERONE 200 MG/1
1 CAPSULE ONE TIME  DAILY CAPSULE ORAL
Status: ACTIVE | COMMUNITY

## 2023-08-08 RX ORDER — BUDESONIDE 3 MG/1
3 CAPSULES CAPSULE, COATED PELLETS ORAL ONCE A DAY
Qty: 90 | Refills: 3 | Status: ON HOLD | COMMUNITY

## 2023-08-08 RX ORDER — COLESEVELAM HYDROCHLORIDE 625 MG/1
1 TABLET TABLET, COATED ORAL ONCE DAILY
OUTPATIENT
Start: 2022-05-06

## 2023-08-08 RX ORDER — BIMATOPROST 0.1 MG/ML
1 DROP INTO AFFECTED EYE IN THE EVENING SOLUTION/ DROPS OPHTHALMIC ONCE A DAY
Status: ACTIVE | COMMUNITY

## 2023-08-08 RX ORDER — B-COMPLEX WITH VITAMIN C
1 TABLET TABLET ORAL ONCE A DAY
Status: ACTIVE | COMMUNITY

## 2023-08-08 RX ORDER — NORETHINDRONE ACETATE AND ETHINYL ESTRADIOL, ETHINYL ESTRADIOL AND FERROUS FUMARATE 1MG-10(24)
TAKE 1 TABLET DAILY KIT ORAL
Refills: 0 | Status: ON HOLD | COMMUNITY

## 2023-08-08 RX ORDER — BRIMONIDINE TARTRATE 1 MG/ML
1 DROP INTO AFFECTED EYE SOLUTION/ DROPS OPHTHALMIC
Status: ACTIVE | COMMUNITY

## 2023-08-08 RX ORDER — ASCORBIC ACID/ASCORBATE SODIUM 500 MG
1 TABLET TABLET,CHEWABLE ORAL ONCE A DAY
Status: ACTIVE | COMMUNITY

## 2023-08-08 NOTE — HPI-TODAY'S VISIT:
49 yo woman presenting for follow up a colonoscopy.   She was seen in the office 5/26/23 in follow up for collagenous colitis. Prior colonoscopy performed for diarrhea was notable for a normal appearing colon, though random biopsies showed collagenous colitis. She completed an 8 week course of budesonide therapy. Diarrhea recurred after stopping the treatment. She restarted budesonide 9 mg each day and every time she tries to wean to 3 mg a day she begins having diarrhea again. She has tried WelChol but used to tablet only a few times per week. She did not feel that it helped. She denies any NSAIDs. She was placed on a second course of budesonide, and planned for colonosocpy.  A colonoscopy on 6/13/23 revealed a good bowel preparation, normal TI, moderately tortuous colon, nromal rectum, removal of a 7 mm polyp from distal ascending colon, and normal mucosa throughout the entire colon. Pathology revealed the polyp to be a sessile serrated adenoma; a repeat colonoscopy is recommended in 5 years time. Random colon biopsies were without any pathology.  She is doing well today. She is taking colesevelam daily, and experiencing good control of diarrhea. There is no abdominal pain, nausea, vomiting or blood per rectum.

## 2024-04-07 NOTE — HPI-TODAY'S VISIT:
46-year-old woman with a several month history of lower abdominal cramping with diarrhea predominant change in bowel habits, likely on the basis of functional bowel disorder, presenting for follow-up after labs and colonoscopy. She was seen in the office on 9/8/2021.  She complained of a several month history of lower abdominal cramping with diarrhea.  There was no fever, chills, nocturnal stools or blood per rectum.  Stool studies were negative for Salmonella and Campylobacter, ova and parasite, C. difficile.  Lactoferrin was normal.  Celiac panel was negative as well.  CMP was normal as was CBC.  A colonoscopy was performed 9/15/2021.  Good bowel preparation.  Normal ileum.  Normal rectum.  Normal mucosa throughout the entire examined colon.  Random biopsies were obtained and demonstrated collagenous colitis.  She was started on budesonide, 3 tablets daily for 8 weeks.  She has been on budesonide 3 tabs daily for about 2 weeks. Her stools have returned to normal. No blood per rectum. No abdominal pain. No nausea or vomiting. Weight is stable. Her appetite is fair. Current diet order meets estimated nutrient requirements

## 2024-08-29 ENCOUNTER — OFFICE VISIT (OUTPATIENT)
Dept: URBAN - METROPOLITAN AREA CLINIC 113 | Facility: CLINIC | Age: 49
End: 2024-08-29